# Patient Record
Sex: FEMALE | Race: WHITE | NOT HISPANIC OR LATINO | Employment: STUDENT | URBAN - METROPOLITAN AREA
[De-identification: names, ages, dates, MRNs, and addresses within clinical notes are randomized per-mention and may not be internally consistent; named-entity substitution may affect disease eponyms.]

---

## 2018-04-30 ENCOUNTER — OFFICE VISIT (OUTPATIENT)
Dept: URGENT CARE | Facility: CLINIC | Age: 23
End: 2018-04-30
Payer: COMMERCIAL

## 2018-04-30 VITALS
HEIGHT: 67 IN | BODY MASS INDEX: 23.54 KG/M2 | RESPIRATION RATE: 16 BRPM | TEMPERATURE: 98 F | OXYGEN SATURATION: 99 % | WEIGHT: 150 LBS | DIASTOLIC BLOOD PRESSURE: 79 MMHG | HEART RATE: 101 BPM | SYSTOLIC BLOOD PRESSURE: 126 MMHG

## 2018-04-30 DIAGNOSIS — I88.9 LYMPHADENITIS: ICD-10-CM

## 2018-04-30 DIAGNOSIS — J02.9 SORE THROAT: Primary | ICD-10-CM

## 2018-04-30 DIAGNOSIS — J02.9 ACUTE PHARYNGITIS, UNSPECIFIED ETIOLOGY: ICD-10-CM

## 2018-04-30 LAB
CTP QC/QA: YES
S PYO RRNA THROAT QL PROBE: NEGATIVE

## 2018-04-30 PROCEDURE — 99213 OFFICE O/P EST LOW 20 MIN: CPT | Mod: S$GLB,,, | Performed by: NURSE PRACTITIONER

## 2018-04-30 PROCEDURE — 87880 STREP A ASSAY W/OPTIC: CPT | Mod: QW,S$GLB,, | Performed by: NURSE PRACTITIONER

## 2018-04-30 RX ORDER — AMOXICILLIN AND CLAVULANATE POTASSIUM 875; 125 MG/1; MG/1
1 TABLET, FILM COATED ORAL 2 TIMES DAILY
Qty: 14 TABLET | Refills: 0 | Status: SHIPPED | OUTPATIENT
Start: 2018-04-30 | End: 2018-05-10

## 2018-04-30 RX ORDER — PREDNISONE 10 MG/1
10 TABLET ORAL DAILY
Qty: 5 TABLET | Refills: 0 | Status: SHIPPED | OUTPATIENT
Start: 2018-04-30 | End: 2018-05-05

## 2018-04-30 NOTE — PROGRESS NOTES
"Subjective:       Patient ID: Khushi Higgins is a 22 y.o. female.    Vitals:  height is 5' 7" (1.702 m) and weight is 68 kg (150 lb). Her oral temperature is 97.9 °F (36.6 °C). Her blood pressure is 126/79 and her pulse is 101. Her respiration is 16 and oxygen saturation is 99%.     Chief Complaint: Sore Throat    23 y/o female here with recurrent sore throat. Has happened 3 or 4 times this year. The worst episode was about a month ago with fever, trouble swallowing. Went away without tx after about a week. Now starting with sore throat again. Left ear, throat, neck hurts today.       Sore Throat    This is a new problem. The current episode started yesterday. The problem has been unchanged. The pain is worse on the left side. There has been no fever. The pain is at a severity of 8/10. The pain is moderate. Associated symptoms include ear pain, neck pain (lymphnodes, left), swollen glands and trouble swallowing. Pertinent negatives include no abdominal pain, congestion, coughing, diarrhea, headaches, shortness of breath or vomiting. She has tried NSAIDs for the symptoms. The treatment provided no relief.     Review of Systems   Constitution: Negative for chills, diaphoresis, fever, weakness and malaise/fatigue.   HENT: Positive for ear pain, sore throat and trouble swallowing. Negative for congestion.    Cardiovascular: Negative for chest pain.   Respiratory: Negative for cough, shortness of breath and sputum production.    Skin: Negative for rash.   Musculoskeletal: Positive for neck pain (lymphnodes, left). Negative for back pain and joint pain.   Gastrointestinal: Negative for abdominal pain, constipation, diarrhea, nausea and vomiting.   Neurological: Negative for headaches.   Psychiatric/Behavioral: The patient is not nervous/anxious.        Objective:      Physical Exam   Constitutional: She is oriented to person, place, and time. She appears well-developed and well-nourished.   HENT:   Head: Normocephalic " and atraumatic.   Right Ear: External ear normal.   Left Ear: External ear normal.   Nose: Nose normal.   Mouth/Throat: No oropharyngeal exudate.   Left tonsil with plus 2 swelling, no erythema, no pus. + cervical node tenderness   Cardiovascular: Normal rate, regular rhythm and normal heart sounds.    Pulmonary/Chest: Effort normal and breath sounds normal.   Abdominal: Soft. Bowel sounds are normal. There is no tenderness.   Musculoskeletal: She exhibits no edema.   Neurological: She is alert and oriented to person, place, and time.   Skin: Skin is warm and dry.   Psychiatric: She has a normal mood and affect. Her behavior is normal. Judgment and thought content normal.   Nursing note and vitals reviewed.      Assessment:       1. Sore throat    2. Acute pharyngitis, unspecified etiology    3. Lymphadenitis        Plan:       1. Sore throat    - POCT rapid strep A    2. Acute pharyngitis, unspecified etiology  No impressive s/s today. Will give steroid to try to get her over this, but if no better in 48 hours, initiate a/b. Seems like immune system is prob down allowing her multiple infections.   - CULTURE, STREP A,  THROAT  - amoxicillin-clavulanate 875-125mg (AUGMENTIN) 875-125 mg per tablet; Take 1 tablet by mouth 2 (two) times daily.  Dispense: 14 tablet; Refill: 0    3. Lymphadenitis    - CULTURE, STREP A,  THROAT  - amoxicillin-clavulanate 875-125mg (AUGMENTIN) 875-125 mg per tablet; Take 1 tablet by mouth 2 (two) times daily.  Dispense: 14 tablet; Refill: 0    Rest, fluids, motrin, vitamins.

## 2018-04-30 NOTE — PATIENT INSTRUCTIONS

## 2018-05-03 ENCOUNTER — TELEPHONE (OUTPATIENT)
Dept: URGENT CARE | Facility: CLINIC | Age: 23
End: 2018-05-03

## 2018-05-03 LAB — S PYO THROAT QL CULT: POSITIVE

## 2018-05-03 NOTE — TELEPHONE ENCOUNTER
Attempted to speak to pt in regards to her positive strep. She was in class and unable to continue conversation. Did notify her of positive strep result. Reports she went to another doctor and received a steroid shot and a round of a/b. I advised her that I needed her to be on the correct a/b. She did not know what a/b she had taken from last doctor. She had to hang up with me since she was in class.     I did call [facility]. She did fill augmentin, but unsure if she actually took it. Did not fill another rx there for a/b. Will get staff to attempt to call her later for further advising.     STAFF:   She needs to have taken the augmentin or we need to call out amoxil.

## 2018-05-09 ENCOUNTER — TELEPHONE (OUTPATIENT)
Dept: URGENT CARE | Facility: CLINIC | Age: 23
End: 2018-05-09